# Patient Record
Sex: FEMALE | Race: WHITE | NOT HISPANIC OR LATINO | Employment: STUDENT | RURAL
[De-identification: names, ages, dates, MRNs, and addresses within clinical notes are randomized per-mention and may not be internally consistent; named-entity substitution may affect disease eponyms.]

---
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
## 2022-02-17 ENCOUNTER — TELEPHONE (OUTPATIENT)
Dept: FAMILY MEDICINE CLINIC | Facility: CLINIC | Age: 22
End: 2022-02-17

## 2022-02-17 NOTE — TELEPHONE ENCOUNTER
Caller: Ofelia Hawley    Relationship: Self    Best call back number: [phone]    What form or medical record are you requesting: PROOF OF PREGNANCY    Who is requesting this form or medical record from you: OB/GYN OFFICE  [facility]    How would you like to receive the form or medical records (pick-up, mail, fax): FAX    If fax, what is the fax number: [phone]    Timeframe paperwork needed: ASAP    Additional notes: PATIENT IS REQUESTING THAT DR JAIN FAX OVER A COPY OF HER PROOF OF PREGNANCY TO HER OB/GYN AT [facility].

## 2023-10-11 ENCOUNTER — HOSPITAL ENCOUNTER (EMERGENCY)
Dept: HOSPITAL 33 - ED | Age: 23
Discharge: HOME | End: 2023-10-11
Payer: COMMERCIAL

## 2023-10-11 VITALS — TEMPERATURE: 96.8 F

## 2023-10-11 VITALS — OXYGEN SATURATION: 98 %

## 2023-10-11 VITALS — HEART RATE: 84 BPM | DIASTOLIC BLOOD PRESSURE: 82 MMHG | SYSTOLIC BLOOD PRESSURE: 127 MMHG | RESPIRATION RATE: 18 BRPM

## 2023-10-11 DIAGNOSIS — M94.0: Primary | ICD-10-CM

## 2023-10-11 DIAGNOSIS — Z72.0: ICD-10-CM

## 2023-10-11 DIAGNOSIS — R07.9: ICD-10-CM

## 2023-10-11 DIAGNOSIS — Z79.899: ICD-10-CM

## 2023-10-11 DIAGNOSIS — Z28.310: ICD-10-CM

## 2023-10-11 DIAGNOSIS — R07.89: ICD-10-CM

## 2023-10-11 LAB
ALBUMIN SERPL-MCNC: 4.6 G/DL (ref 3.5–5)
ALP SERPL-CCNC: 99 U/L (ref 38–126)
ALT SERPL-CCNC: 18 U/L (ref 0–35)
ANION GAP SERPL CALC-SCNC: 12.5 MEQ/L (ref 5–15)
AST SERPL QL: 24 U/L (ref 14–36)
BASOPHILS # BLD AUTO: 0.03 X10^3/UL (ref 0–0.4)
BASOPHILS NFR BLD AUTO: 0.5 % (ref 0–0.4)
BILIRUB BLD-MCNC: 0.3 MG/DL (ref 0.2–1.3)
BUN SERPL-MCNC: 6 MG/DL (ref 7–17)
CALCIUM SPEC-MCNC: 9.1 MG/DL (ref 8.4–10.2)
CHLORIDE SERPL-SCNC: 107 MMOL/L (ref 98–107)
CO2 SERPL-SCNC: 26 MMOL/L (ref 22–30)
CREAT SERPL-MCNC: 0.65 MG/DL (ref 0.52–1.04)
EOSINOPHIL # BLD AUTO: 0.14 X10^3/UL (ref 0–0.5)
GFR SERPLBLD BASED ON 1.73 SQ M-ARVRAT: > 60 ML/MIN
GLUCOSE SERPL-MCNC: 93 MG/DL (ref 74–106)
HCG UR QL: NEGATIVE
HCT VFR BLD AUTO: 42.7 % (ref 35–47)
HGB BLD-MCNC: 13.7 G/DL (ref 12–16)
IMM GRANULOCYTES # BLD: 0.01 X10^3U/L (ref 0–0.03)
IMM GRANULOCYTES NFR BLD: 0.2 % (ref 0–0.4)
LYMPHOCYTES # SPEC AUTO: 2.08 X10^3/UL (ref 1–4.6)
MCH RBC QN AUTO: 27.6 PG (ref 26–32)
MCHC RBC AUTO-ENTMCNC: 32.1 G/DL (ref 32–36)
MONOCYTES # BLD AUTO: 0.55 X10^3/UL (ref 0–1.3)
NRBC # BLD AUTO: 0 X10^3U/L (ref 0–0.01)
NRBC BLD AUTO-RTO: 0 % (ref 0–0.1)
PLATELET # BLD AUTO: 283 X10^3/UL (ref 150–450)
POTASSIUM SERPLBLD-SCNC: 3.6 MMOL/L (ref 3.5–5.1)
PROT SERPL-MCNC: 7.8 G/DL (ref 6.3–8.2)
RBC # BLD AUTO: 4.97 X10^6/UL (ref 4.1–5.4)
SODIUM SERPL-SCNC: 142 MMOL/L (ref 137–145)
WBC # BLD AUTO: 6.1 X10^3/UL (ref 4–10.5)

## 2023-10-11 PROCEDURE — 36415 COLL VENOUS BLD VENIPUNCTURE: CPT

## 2023-10-11 PROCEDURE — 80053 COMPREHEN METABOLIC PANEL: CPT

## 2023-10-11 PROCEDURE — 96374 THER/PROPH/DIAG INJ IV PUSH: CPT

## 2023-10-11 PROCEDURE — 93005 ELECTROCARDIOGRAM TRACING: CPT

## 2023-10-11 PROCEDURE — 94760 N-INVAS EAR/PLS OXIMETRY 1: CPT

## 2023-10-11 PROCEDURE — 93041 RHYTHM ECG TRACING: CPT

## 2023-10-11 PROCEDURE — 99284 EMERGENCY DEPT VISIT MOD MDM: CPT

## 2023-10-11 PROCEDURE — 36000 PLACE NEEDLE IN VEIN: CPT

## 2023-10-11 PROCEDURE — 85025 COMPLETE CBC W/AUTO DIFF WBC: CPT

## 2023-10-11 PROCEDURE — 84484 ASSAY OF TROPONIN QUANT: CPT

## 2023-10-11 PROCEDURE — 85379 FIBRIN DEGRADATION QUANT: CPT

## 2023-10-11 PROCEDURE — 83880 ASSAY OF NATRIURETIC PEPTIDE: CPT

## 2023-10-11 PROCEDURE — 71045 X-RAY EXAM CHEST 1 VIEW: CPT

## 2023-10-11 PROCEDURE — 81025 URINE PREGNANCY TEST: CPT

## 2023-10-11 RX ADMIN — KETOROLAC TROMETHAMINE ONE MG: 30 INJECTION, SOLUTION INTRAMUSCULAR; INTRAVENOUS at 23:32

## 2023-10-11 NOTE — ERPHSYRPT
- History of Present Illness


Time Seen by Provider: 10/11/23 19:56


Historian: patient


Exam Limitations: no limitations


Patient Subjective Stated Complaint: pt states that she was cooking and began to

have chest pain


Triage Nursing Assessment: pt ambulated into the er; pt is axo x4; c/o chest 

pain; pt states 7/10 pain to chest; pt denies pain that radiates; clear apical 

heart tone; strong deandre radial pulse; strong deandre pedal pulses; no edema present; 

c/o nausea; skin PDW; no respiratory distress; vital wnl


Physician History: 


Patient is a 22-year-old female presents to the emergency department for 

evaluation of chest pain that started just prior to arrival while she was 

cooking dinner.  Pain rated 7 out of 10 and is substernal.  No radiation to her 

back or to her arms.  Mild nausea.  No vomiting.  No diaphoresis.  Patient 

appears somewhat anxious.  Pain reproduced with palpation to her anterior chest 

wall along the costal costal junction.  Patient has no cardiac history.  Patient

has no significant past medical history.   at bedside.  They voiced no 

other complaints or concerns at this time.








Portions of this note were created with voice recognition technology.  There may

be grammatical, spelling, punctuation or sound alike errors








Timing/Duration: today


Activities at Onset: activity (Cooking)


Quality: aching


Location: substernal


Severity of Pain-Max: moderate


Severity of Pain-Current: mild


Associated Symptoms: nausea


Prior Chest Pain/Cardiac Workup: no prior chest pain


Nitro Today/Relief: no nitro taken today


Aspirin Treatment Today: no aspirin today


Allergies/Adverse Reactions: 








No Known Drug Allergies Allergy (Unverified 10/11/23 19:31)


   





Home Medications: 








Bupropion HCl 150 mg Sr*** [Wellbutrin  MG***] 300 mg PO DAILY 10/11/23 

[History]





Hx Tetanus, Diphtheria Vaccination/Date Given: Yes


Hx Influenza Vaccination/Date Given: No


Hx Pneumococcal Vaccination/Date Given: No





Travel Risk





- International Travel


Have you traveled outside of the country in past 3 weeks: No





- Coronavirus Screening


Are you exhibiting any of the following symptoms?: No


Close contact with a COVID-19 positive Pt in past 14-21 Days: No





- Vaccine Status


Have you recieved a Covid-19 vaccination: No





- Review of Systems


Constitutional: No Symptoms, No Fever, No Chills


Eyes: No Symptoms


Ears, Nose, & Throat: No Symptoms


Respiratory: No Symptoms, No Cough, No Dyspnea


Cardiac: No Symptoms, No Chest Pain, No Edema, No Syncope


Abdominal/Gastrointestinal: No Symptoms, No Abdominal Pain, No Nausea, No 

Vomiting, No Diarrhea


Genitourinary Symptoms: No Symptoms, No Dysuria


Musculoskeletal: No Symptoms, No Back Pain, No Neck Pain


Skin: No Symptoms, No Rash


Neurological: No Symptoms, No Dizziness, No Focal Weakness, No Sensory Changes


Psychological: No Symptoms


Endocrine: No Symptoms


Hematologic/Lymphatic: No Symptoms


Immunological/Allergic: No Symptoms


All Other Systems: Reviewed and Negative





- Past Medical History


Pertinent Past Medical History: Yes


Neurological History: No Pertinent History


Cardiac History: No Pertinent History


Respiratory History: No Pertinent History


Endocrine Medical History: No Pertinent History


Musculoskeletal History: No Pertinent History


GI Medical History: No Pertinent History


 History: No Pertinent History


Psycho-Social History: Anxiety, Depression


Female Reproductive Disorders: No Pertinent History





- Past Surgical History


Past Surgical History: No





- Social History


Smoking Status: Light tobacco smoker


Exposure to second hand smoke: No


Drug Use: other


Patient Lives Alone: No





- Female History


Hx Pregnant Now: No





- Nursing Vital Signs


Nursing Vital Signs: 


                               Initial Vital Signs











Temperature  96.8 F   10/11/23 19:30


 


Pulse Rate  84   10/11/23 19:30


 


Respiratory Rate  24   10/11/23 19:30


 


Blood Pressure  132/98   10/11/23 19:30


 


O2 Sat by Pulse Oximetry  99   10/11/23 19:30








                                   Pain Scale











Pain Intensity                 4

















- Physical Exam


General Appearance: no apparent distress, alert


Eye Exam: PERRL/EOMI, eyes nml inspection


Ears, Nose, Throat Exam: normal ENT inspection, moist mucous membranes


Neck Exam: normal inspection, non-tender, supple, full range of motion


Respiratory Exam: normal breath sounds, lungs clear, airway intact, No 

respiratory distress


Cardiovascular Exam: regular rate/rhythm, normal heart sounds, normal peripheral

 pulses


Gastrointestinal/Abdomen Exam: soft, No tenderness, No mass


Back Exam: normal inspection, No CVA tenderness, No vertebral tenderness


Extremity Exam: normal inspection, normal range of motion


Neurologic Exam: alert, oriented x 3, cooperative, normal mood/affect, sensation

 nml, No motor deficits


Skin Exam: normal color, warm, dry


**SpO2 Interpretation**: normal


SpO2: 98


O2 Delivery: Room Air





- Course


Nursing assessment & vital signs reviewed: Yes


EKG Interpreted by Me: RATE ([id number]), Sinus Rhythm, NORMAL AXIS, NORMAL INTERVALS





- Radiology Exams


  ** Chest


X-ray Interpretation: Interpreted by me (Normal chest x-ray.  No acute findings)


Ordered Tests: 


                               Active Orders 24 hr











 Category Date Time Status


 


 Cardiac Monitor STAT Care  10/11/23 19:52 Active


 


 EKG-ER Only STAT Care  10/11/23 19:52 Active


 


 IV Insertion STAT Care  10/11/23 19:52 Active


 


 Pulse Oximetry (ED) STAT Care  10/11/23 19:52 Active


 


 CHEST 1 VIEW (PORTABLE) Stat Exams  10/11/23 21:31 Taken


 


 CBC W DIFF Stat Lab  10/11/23 17:45 Completed


 


 CMP Stat Lab  10/11/23 17:45 Completed


 


 D-DIMER QUANTITATIVE Stat Lab  10/11/23 17:45 Completed


 


 HCG QUALITATIVE, URINE Stat Lab  10/11/23 22:29 Completed


 


 NT PRO BNPII Stat Lab  10/11/23 17:45 Completed


 


 TROPONIN Q4H Lab  10/11/23 17:45 Completed


 


 TROPONIN Q4H Lab  10/12/23 00:00 Ordered


 


 TROPONIN Q4H Lab  10/12/23 04:00 Ordered


 


 TROPONIN Stat Lab  10/11/23 22:30 Completed











Lab/Rad Data: 


                           Laboratory Result Diagrams





                                 10/11/23 17:45 





                                 10/11/23 17:45 





                               Laboratory Results











  10/11/23 10/11/23 10/11/23 Range/Units





  22:30 22:29 17:45 


 


WBC     (4.0-10.5)  x10^3/uL


 


RBC     (4.1-5.4)  x10^6/uL


 


Hgb     (12.0-16.0)  g/dL


 


Hct     (35-47)  %


 


MCV     ()  fL


 


MCH     (26-32)  pg


 


MCHC     (32-36)  g/dL


 


RDW     (11.5-14.0)  %


 


Plt Count     (150-450)  x10^3/uL


 


MPV     (7.5-11.0)  fL


 


Gran %     (36.0-66.0)  %


 


Immature Gran % (Auto)     (0.00-0.4)  %


 


Nucleat RBC Rel Count     (0.00-0.1)  %


 


Eos # (Auto)     (0-0.5)  x10^3/uL


 


Immature Gran # (Auto)     (0.00-0.03)  x10^3u/L


 


Absolute Lymphs (auto)     (1.0-4.6)  x10^3/uL


 


Absolute Monos (auto)     (0.0-1.3)  x10^3/uL


 


Absolute Nucleated RBC     (0.00-0.01)  x10^3u/L


 


Lymphocytes %     (24.0-44.0)  %


 


Monocytes %     (0.0-12.0)  %


 


Eosinophils %     (0.00-5.0)  %


 


Basophils %     (0.0-0.4)  %


 


Absolute Granulocytes     (1.4-6.9)  x10^3/uL


 


Basophils #     (0-0.4)  x10^3/uL


 


D-Dimer     (0.0-0.50)  mg/L


 


Sodium     (137-145)  mmol/L


 


Potassium     (3.5-5.1)  mmol/L


 


Chloride     ()  mmol/L


 


Carbon Dioxide     (22-30)  mmol/L


 


Anion Gap     (5-15)  MEQ/L


 


BUN     (7-17)  mg/dL


 


Creatinine     (0.52-1.04)  mg/dL


 


Estimated GFR     ML/MIN


 


Glucose     ()  mg/dL


 


Calcium     (8.4-10.2)  mg/dL


 


Total Bilirubin     (0.2-1.3)  mg/dL


 


AST     (14-36)  U/L


 


ALT     (0-35)  U/L


 


Alkaline Phosphatase     ()  U/L


 


Troponin I  < 0.012    (0.000-0.034)  ng/mL


 


NT-Pro-B Natriuret Pep    < 20.0  (<300)  pg/mL


 


Serum Total Protein     (6.3-8.2)  g/dL


 


Albumin     (3.5-5.0)  g/dL


 


Urine HCG, Qual   NEGATIVE   (NEGATIVE)  














  10/11/23 10/11/23 10/11/23 Range/Units





  17:45 17:45 17:45 


 


WBC     (4.0-10.5)  x10^3/uL


 


RBC     (4.1-5.4)  x10^6/uL


 


Hgb     (12.0-16.0)  g/dL


 


Hct     (35-47)  %


 


MCV     ()  fL


 


MCH     (26-32)  pg


 


MCHC     (32-36)  g/dL


 


RDW     (11.5-14.0)  %


 


Plt Count     (150-450)  x10^3/uL


 


MPV     (7.5-11.0)  fL


 


Gran %     (36.0-66.0)  %


 


Immature Gran % (Auto)     (0.00-0.4)  %


 


Nucleat RBC Rel Count     (0.00-0.1)  %


 


Eos # (Auto)     (0-0.5)  x10^3/uL


 


Immature Gran # (Auto)     (0.00-0.03)  x10^3u/L


 


Absolute Lymphs (auto)     (1.0-4.6)  x10^3/uL


 


Absolute Monos (auto)     (0.0-1.3)  x10^3/uL


 


Absolute Nucleated RBC     (0.00-0.01)  x10^3u/L


 


Lymphocytes %     (24.0-44.0)  %


 


Monocytes %     (0.0-12.0)  %


 


Eosinophils %     (0.00-5.0)  %


 


Basophils %     (0.0-0.4)  %


 


Absolute Granulocytes     (1.4-6.9)  x10^3/uL


 


Basophils #     (0-0.4)  x10^3/uL


 


D-Dimer   0.41   (0.0-0.50)  mg/L


 


Sodium    142  (137-145)  mmol/L


 


Potassium    3.6  (3.5-5.1)  mmol/L


 


Chloride    107  ()  mmol/L


 


Carbon Dioxide    26  (22-30)  mmol/L


 


Anion Gap    12.5  (5-15)  MEQ/L


 


BUN    6 L  (7-17)  mg/dL


 


Creatinine    0.65  (0.52-1.04)  mg/dL


 


Estimated GFR    > 60.0  ML/MIN


 


Glucose    93  ()  mg/dL


 


Calcium    9.1  (8.4-10.2)  mg/dL


 


Total Bilirubin    0.30  (0.2-1.3)  mg/dL


 


AST    24  (14-36)  U/L


 


ALT    18  (0-35)  U/L


 


Alkaline Phosphatase    99  ()  U/L


 


Troponin I  < 0.012    (0.000-0.034)  ng/mL


 


NT-Pro-B Natriuret Pep     (<300)  pg/mL


 


Serum Total Protein    7.8  (6.3-8.2)  g/dL


 


Albumin    4.6  (3.5-5.0)  g/dL


 


Urine HCG, Qual     (NEGATIVE)  














  10/11/23 Range/Units





  17:45 


 


WBC  6.1  (4.0-10.5)  x10^3/uL


 


RBC  4.97  (4.1-5.4)  x10^6/uL


 


Hgb  13.7  (12.0-16.0)  g/dL


 


Hct  42.7  (35-47)  %


 


MCV  85.9  ()  fL


 


MCH  27.6  (26-32)  pg


 


MCHC  32.1  (32-36)  g/dL


 


RDW  12.6  (11.5-14.0)  %


 


Plt Count  283  (150-450)  x10^3/uL


 


MPV  11.6 H  (7.5-11.0)  fL


 


Gran %  54.0  (36.0-66.0)  %


 


Immature Gran % (Auto)  0.2  (0.00-0.4)  %


 


Nucleat RBC Rel Count  0.0  (0.00-0.1)  %


 


Eos # (Auto)  0.14  (0-0.5)  x10^3/uL


 


Immature Gran # (Auto)  0.01  (0.00-0.03)  x10^3u/L


 


Absolute Lymphs (auto)  2.08  (1.0-4.6)  x10^3/uL


 


Absolute Monos (auto)  0.55  (0.0-1.3)  x10^3/uL


 


Absolute Nucleated RBC  0.00  (0.00-0.01)  x10^3u/L


 


Lymphocytes %  34.0  (24.0-44.0)  %


 


Monocytes %  9.0  (0.0-12.0)  %


 


Eosinophils %  2.3  (0.00-5.0)  %


 


Basophils %  0.5  (0.0-0.4)  %


 


Absolute Granulocytes  3.31  (1.4-6.9)  x10^3/uL


 


Basophils #  0.03  (0-0.4)  x10^3/uL


 


D-Dimer   (0.0-0.50)  mg/L


 


Sodium   (137-145)  mmol/L


 


Potassium   (3.5-5.1)  mmol/L


 


Chloride   ()  mmol/L


 


Carbon Dioxide   (22-30)  mmol/L


 


Anion Gap   (5-15)  MEQ/L


 


BUN   (7-17)  mg/dL


 


Creatinine   (0.52-1.04)  mg/dL


 


Estimated GFR   ML/MIN


 


Glucose   ()  mg/dL


 


Calcium   (8.4-10.2)  mg/dL


 


Total Bilirubin   (0.2-1.3)  mg/dL


 


AST   (14-36)  U/L


 


ALT   (0-35)  U/L


 


Alkaline Phosphatase   ()  U/L


 


Troponin I   (0.000-0.034)  ng/mL


 


NT-Pro-B Natriuret Pep   (<300)  pg/mL


 


Serum Total Protein   (6.3-8.2)  g/dL


 


Albumin   (3.5-5.0)  g/dL


 


Urine HCG, Qual   (NEGATIVE)  














- Progress


Progress: improved


Air Movement: good


Progress Note: 


Patient is a 22-year-old female presents to our ED for evaluation of substernal 

chest pain.  Physical exam reveals tenderness along the costochondral junction. 

 Palpation to this area reproduces her pain.  Work-up included an EKG which 

revealed a normal sinus rhythm.  Chest x-ray shows no acute findings.  CBC CMP 

within normal limits.  D-dimer negative.  hCG negative.  Troponin negative x2.  

BNP within normal limits.  Patient received Toradol for pain control.  A 

prescription for Toradol forwarded to patient's pharmacy.  Patient understands 

at some point follow-up with her primary care doctor for further evaluation and 

treatment.  Significant other at bedside.  They voiced no other complaints or 

concerns at this time.











Portions of this note were created with voice recognition technology.  There may

 be grammatical, spelling, punctuation or sound alike errors





Complexity of problems addressed as moderate acute complicated








No critical care time








Complexity of data reviewed and analyzed is moderate.  Test ordered test 

reviewed.  Results of laboratory and imaging studies were analyzed and clinic

ally correlated with history and physical exam.











Risk of complication and or risk of morbidity/mortality of patient management is

 moderate.  A prescription for Toradol is forwarded to patient's pharmacy.  





Patient discharged home.  Vital stable.  Patient resting comfortably.  Time 

spent to discharge patient approximately 15 minutes.  Plan of care established 

for shared decision making.  No social determinants of health present impede 

follow-up.











Portions of this note were created with voice recognition technology.  There may

 be grammatical, spelling, punctuation or sound alike errors








10/11/23 23:35





10/11/23 23:37





Blood Culture(s) Obtained: No


Antibiotics given: No


Counseled pt/family regarding: lab results, diagnosis, need for follow-up, rad 

results





- Departure


Departure Disposition: Home


Clinical Impression: 


 Chest pain, Chest wall tenderness, Costochondritis





Condition: Stable


Critical Care Time: No


Referrals: 


NURY ROUSSEAU, [place], RN [Primary Care Provider] - Follow up/PCP as directed


Additional Instructions: 


Discharge/Care Plan





MARNI SAUCEDO was seen on 10/11/23 in the Emergency Room. The patient was 

counseled regarding Diagnosis,Lab results, Imaging studies, need for follow up 

and when to return to the Emergency Room.





Prescriptions given:





Discharge Note





I have spoken with the patient and/or caregivers. I have explained the patient's

condition, diagnosis and treatment plan based on the information available to me

at this time. I have answered the patient's and/or caregiver's questions and 

addressed any concerns. The patient and/or caregivers have as good understanding

of the patient's diagnosis, condition and treatment plan as can be expected at 

this point. The vital signs have been stable. The patient's condition is stable 

and appropriate for discharge from the emergency department.





The patient will pursue further outpatient evaluation with the primary care 

physician or other designated or consulting physician as outlined in the 

discharge instructions. The patient and/or caregivers are agreeable to this plan

of care and follow-up instructions have been explained in detail. The patient 

and/or caregivers have received these instruction. The patient/and or caregivers

are aware that any significant change in condition or worsening of symptoms 

should prompt an immediate return to this or the closest emergency department or

call 911. 








Prescriptions: 


Ketorolac Trometh 10 mg Tab** [TORAdol 10 MG TABLET***] 10 mg PO TID 5 Days #15 

tablet